# Patient Record
Sex: FEMALE | Employment: FULL TIME | URBAN - METROPOLITAN AREA
[De-identification: names, ages, dates, MRNs, and addresses within clinical notes are randomized per-mention and may not be internally consistent; named-entity substitution may affect disease eponyms.]

---

## 2022-03-17 ENCOUNTER — TELEPHONE (OUTPATIENT)
Dept: ENDOCRINOLOGY | Age: 22
End: 2022-03-17

## 2022-03-17 NOTE — TELEPHONE ENCOUNTER
Fax from [facility] w/ referral to see Dr Nadir Figueredo    Dx-hyperthyroidism  Yvrose Vail NP

## 2022-06-30 ENCOUNTER — OFFICE VISIT (OUTPATIENT)
Dept: ENDOCRINOLOGY | Age: 22
End: 2022-06-30
Payer: MEDICARE

## 2022-06-30 VITALS
OXYGEN SATURATION: 98 % | HEIGHT: 65 IN | BODY MASS INDEX: 18.99 KG/M2 | TEMPERATURE: 98 F | HEART RATE: 74 BPM | SYSTOLIC BLOOD PRESSURE: 116 MMHG | DIASTOLIC BLOOD PRESSURE: 60 MMHG | WEIGHT: 114 LBS | RESPIRATION RATE: 14 BRPM

## 2022-06-30 DIAGNOSIS — E05.90 HYPERTHYROIDISM: Primary | ICD-10-CM

## 2022-06-30 DIAGNOSIS — Z34.90 PREGNANCY, UNSPECIFIED GESTATIONAL AGE: ICD-10-CM

## 2022-06-30 PROCEDURE — 99204 OFFICE O/P NEW MOD 45 MIN: CPT | Performed by: INTERNAL MEDICINE

## 2022-06-30 NOTE — PROGRESS NOTES
Never Used   Vaping Use    Vaping Use: Former    Start date: 2018   [facility] Quit date: 2020    Passive vaping exposure: Yes   Substance and Sexual Activity    Alcohol use: Not Currently    Drug use: Never    Sexual activity: Not Currently   Other Topics Concern    None   Social History Narrative    None     Social Determinants of Health     Financial Resource Strain:     Difficulty of Paying Living Expenses: Not on file   Food Insecurity:     Worried About Running Out of Food in the Last Year: Not on file    Jaelyn of Food in the Last Year: Not on file   Transportation Needs:     Lack of Transportation (Medical): Not on file    Lack of Transportation (Non-Medical): Not on file   Physical Activity:     Days of Exercise per Week: Not on file    Minutes of Exercise per Session: Not on file   Stress:     Feeling of Stress : Not on file   Social Connections:     Frequency of Communication with Friends and Family: Not on file    Frequency of Social Gatherings with Friends and Family: Not on file    Attends Syn[employer] Services: Not on file    Active Member of [address] [place] [address] [employer] or Organizations: Not on file    Attends Club or Organization Meetings: Not on file    Marital Status: Not on file   Intimate Partner Violence:     Fear of Current or Ex-Partner: Not on file    Emotionally Abused: Not on file    Physically Abused: Not on file    Sexually Abused: Not on file   Housing Stability:     Unable to Pay for Housing in the Last Year: Not on file    Number of [place] in the Last Year: Not on file    Unstable Housing in the Last Year: Not on file     Current Outpatient Medications   Medication Sig Dispense Refill    Prenatal Vit-Fe Fumarate-FA (PRENATAL COMPLETE PO) Take 1 capsule by mouth daily       No current facility-administered medications for this visit.      No Known Allergies  Family Status   Relation Name Status    Mother  Alive    Father      Brother  Alive    MGM  Alive    MGF Alive    PGM  Alive    PGF  Alive       Review of Systems:  Constitutional: has fatigue, no fever, no recent weight gain, no recent weight loss, no changes in appetite  Eyes: no eye pain, no change in vision, no eye redness, no eye irritation, no double vision  Ears, nose, throat: no nasal congestion, no sore throat, no earache, no decrease in hearing, no hoarseness, no dry mouth, no sinus problems, no difficulty swallowing, no neck lumps, no dental problems, no mouth sores, no ringing in ears  Pulmonary: no shortness of breath, no wheezing, no dyspnea on exertion, no cough  Cardiovascular: no chest pain, no lower extremity edema, no orthopnea, no intermittent leg claudication, no palpitations  Gastrointestinal: no abdominal pain, has nausea, no vomiting, has diarrhea, no constipation, no dysphagia, no heartburn, no bloating  Genitourinary: no dysuria, no urinary incontinence, no urinary hesitancy, has urinary frequency, no feelings of urinary urgency, no nocturia  Musculoskeletal: no joint swelling, has joint stiffness, no joint pain, no muscle cramps, no muscle pain  Integument/Breast: no hair loss, no skin rashes, no skin lesions, no itching, no dry skin  Neurological: no numbness, no tingling, no weakness, no confusion, has headaches, no dizziness, no fainting, no tremors, no decrease in memory, no balance problems  Psychiatric: has anxiety, has depression, has insomnia  Hematologic/Lymphatic: no tendency for easy bleeding, no swollen lymph nodes, no tendency for easy bruising  Immunology: has seasonal allergies, no frequent infections, no frequent illnesses  Endocrine: has temperature intolerance    /60   Pulse 74   Temp 98 °F (36.7 °C)   Resp 14   Ht 5' 5\" (1.651 m)   Wt 114 lb (51.7 kg)   SpO2 98%   BMI 18.97 kg/m²    Wt Readings from Last 3 Encounters:   06/30/22 114 lb (51.7 kg)     Body mass index is 18.97 kg/m².     OBJECTIVE:  Constitutional: no acute distress, well appearing and well reevaluate. Counseled patient about treatment options, monitoring, methimazole and PTU side effects, targets of treating hyperthyroidism in pregnancy  - T3; Future  - T3, Free; Future  - T4; Future  - T4, Free; Future  - CBC with Auto Differential; Future  - Comprehensive Metabolic Panel; Future  - TSH; Future  - Anti-Thyroglobulin Antibody; Future  - Thyroid Peroxidase Antibody; Future  - Thyroid Stimulating Immunoglobulin; Future  - Thyrotropin receptor antibody; Future  - T3, Free; Future  - T4; Future  - T4, Free; Future  - T3; Future  - TSH; Future    2. Pregnancy, unspecified gestational age  Continue monitoring. Counseled patient how important it is to monitor thyroid disease in pregnancy, treatment options, monitoring and hyperthyroidism, when medication is needed and when its not needed, medication side effects. Reviewed and/or ordered clinical lab results Yes  Reviewed and/or ordered radiology tests Yes   Reviewed and/or ordered other diagnostic tests No  Discussed test results with performing physician No  Independently reviewed image, tracing, or specimen No  Made a decision to obtain old records No  Reviewed and summarized old records Yes   9/2021  TSH 0.27  Total T4 8.7  12/2021TSH 0.242  FT4 1.6  5/3/2022  TSH 0.084  FT4 1.39    Obtained history from other than patient No    Nicolasacaron Gardner was counseled regarding symptoms of hyperthyroidism diagnosis, course and complications of disease if inadequately treated, side effects of medications, diagnosis, treatment options, and prognosis, risks, benefits, complications, and alternatives of treatment, labs, imaging and other studies and treatment targets and goals, thyroid disease in pregnancy, methimazole and PTU side effects, monitoring, treatment targets, monitoring targets and goals. She understands instructions and counseling.     Total time I spent for this encounter gathering history, performing physical exam, coordinating care, counseling, and documenting in the chart - 45 minutes    Return in about 4 weeks (around 7/28/2022) for thyroid problems.     Electronically signed by Breanna Jacob MD on 6/30/2022 at 9:34 PM

## 2022-07-28 ENCOUNTER — OFFICE VISIT (OUTPATIENT)
Dept: ENDOCRINOLOGY | Age: 22
End: 2022-07-28
Payer: COMMERCIAL

## 2022-07-28 VITALS
HEIGHT: 65 IN | DIASTOLIC BLOOD PRESSURE: 64 MMHG | TEMPERATURE: 98 F | OXYGEN SATURATION: 99 % | SYSTOLIC BLOOD PRESSURE: 98 MMHG | RESPIRATION RATE: 14 BRPM | BODY MASS INDEX: 19.66 KG/M2 | HEART RATE: 78 BPM | WEIGHT: 118 LBS

## 2022-07-28 DIAGNOSIS — Z34.90 PREGNANCY, UNSPECIFIED GESTATIONAL AGE: ICD-10-CM

## 2022-07-28 DIAGNOSIS — E05.90 HYPERTHYROIDISM: Primary | ICD-10-CM

## 2022-07-28 PROCEDURE — 99214 OFFICE O/P EST MOD 30 MIN: CPT | Performed by: INTERNAL MEDICINE

## 2022-07-28 RX ORDER — PROPYLTHIOURACIL 50 MG/1
50 TABLET ORAL DAILY
Qty: 30 TABLET | Refills: 2 | Status: SHIPPED | OUTPATIENT
Start: 2022-07-28 | End: 2023-07-28

## 2022-07-28 NOTE — PROGRESS NOTES
SUBJECTIVE:  Mychal Rivas is a 24 y.o. female who is being evaluated for hyperthyroidism. 1.  Hyperthyroidism  This started in 11/2021. Patient was diagnosed with hyperthyroidism. The problem has been unchanged. Previous thyroid studies include: TSH and free thyroxine. Patient started medication in N/A. Currently patient is on: N/A. Misses  N/A doses a month. Had baby girl in 3/2020.  5/2021 BW showed abnormal labs. Monitored by OB-GYN and PCP. Had neck US. Current complaints: anxiety, insomnia, diarrhea, tremor, headaches  PTSD anxiety and depression in 12/2021. Diarrhea all her life. Has shakiness in hands since 2018. Not worse. No heat intolerance, sweating, palpitations. Weight stable. History of obstructive symptoms: difficulty swallowing No, changes in voice/hoarseness No.  History of radiation to patient's neck: No  Resent iodine exposure: No  Family history includes no thyroid abnormalities. Family history of thyroid cancer: No    2. Pregnancy  20 weeks pregnancy. Due date 12/13/2022. Mild nausea, no vomiting. Last 2 weeks no vomiting. Past Medical History:   Diagnosis Date    Hyperthyroidism      Patient Active Problem List    Diagnosis Date Noted    Hyperthyroidism 06/30/2022    Pregnancy 06/30/2022     History reviewed. No pertinent surgical history.   Family History   Problem Relation Age of Onset    Heart Disease Father     Clotting Disorder Father     Diabetes type 2  Father     No Known Problems Brother     No Known Problems Maternal Grandmother     No Known Problems Maternal Grandfather     No Known Problems Paternal Grandmother     No Known Problems Paternal Grandfather      Social History     Socioeconomic History    Marital status:      Spouse name: None    Number of children: None    Years of education: None    Highest education level: None   Tobacco Use    Smoking status: Never    Smokeless tobacco: Never   Vaping Use    Vaping Use: Former    Start date: 2018    Quit date: 2020    Passive vaping exposure: Yes   Substance and Sexual Activity    Alcohol use: Not Currently    Drug use: Never    Sexual activity: Not Currently     Current Outpatient Medications   Medication Sig Dispense Refill    propylthiouracil (PTU) 50 MG tablet Take 1 tablet by mouth in the morning. 30 tablet 2    Prenatal Vit-Fe Fumarate-FA (PRENATAL COMPLETE PO) Take 1 capsule by mouth daily       No current facility-administered medications for this visit.      No Known Allergies  Family Status   Relation Name Status    Mother  Alive    Father      Brother  Alive    MGM  Alive    MGF  Alive    [address]  Alive    PGF  Alive       Review of Systems:  Constitutional: has fatigue, no fever, no recent weight gain, no recent weight loss, no changes in appetite  Eyes: no eye pain, no change in vision, no eye redness, no eye irritation, no double vision  Ears, nose, throat: no nasal congestion, no sore throat, no earache, no decrease in hearing, no hoarseness, no dry mouth, no sinus problems, no difficulty swallowing, no neck lumps, no dental problems, no mouth sores, no ringing in ears  Pulmonary: no shortness of breath, no wheezing, no dyspnea on exertion, no cough  Cardiovascular: no chest pain, no lower extremity edema, no orthopnea, no intermittent leg claudication, no palpitations  Gastrointestinal: no abdominal pain, has nausea, no vomiting, has diarrhea, no constipation, no dysphagia, no heartburn, no bloating  Genitourinary: no dysuria, no urinary incontinence, no urinary hesitancy, has urinary frequency, no feelings of urinary urgency, no nocturia  Musculoskeletal: no joint swelling, has joint stiffness, no joint pain, no muscle cramps, no muscle pain  Integument/Breast: no hair loss, no skin rashes, no skin lesions, no itching, no dry skin  Neurological: no numbness, no tingling, no weakness, no confusion, has headaches, no dizziness, no fainting, no tremors, no decrease in memory, no balance problems  Psychiatric: has anxiety, has depression, has insomnia  Hematologic/Lymphatic: no tendency for easy bleeding, no swollen lymph nodes, no tendency for easy bruising  Immunology: has seasonal allergies, no frequent infections, no frequent illnesses  Endocrine: has temperature intolerance    BP 98/64   Pulse 78   Temp 98 °F (36.7 °C)   Resp 14   Ht 5' 5\" (1.651 m)   Wt 118 lb (53.5 kg)   SpO2 99%   BMI 19.64 kg/m²    Wt Readings from Last 3 Encounters:   07/28/22 118 lb (53.5 kg)   06/30/22 114 lb (51.7 kg)     Body mass index is 19.64 kg/m².     OBJECTIVE:  Constitutional: no acute distress, well appearing and well nourished  Psychiatric: oriented to person, place and time, judgement and insight and normal, recent and remote memory and intact and mood and affect are normal  Skin: skin and subcutaneous tissue is normal without mass, normal turgor  Head and Face: examination of head and face revealed no abnormalities  Eyes: no lid or conjunctival swelling, erythema or discharge, pupils are normal, equal, round, reactive to light, no lid lag, stare, proptosis  Ears/Nose: external inspection of ears and nose revealed no abnormalities, hearing is grossly normal  Oropharynx/Mouth/Face: lips, tongue and gums are normal with no lesions, the voice quality was normal  Neck: neck is supple and symmetric, with midline trachea and no masses, thyroid is mildly enlarged  Lymphatics: normal cervical lymph nodes, normal supraclavicular nodes  Pulmonary: no increased work of breathing or signs of respiratory distress, lungs are clear to auscultation  Cardiovascular: normal heart rate and rhythm, normal S1 and S2, no murmurs and pedal pulses and 2+ bilaterally, No edema  Abdomen: abdomen is soft, non-tender with no masses  Musculoskeletal: normal gait and station and exam of the digits and nails are normal  Neurological: normal coordination and normal general cortical function, has mild tremor      Lab Review:    No results found for: WBC, HGB, HCT, MCV, PLT  No results found for: NA, K, CL, CO2, BUN, CREATININE, GLUCOSE, CALCIUM, PROT, LABALBU, BILITOT, ALKPHOS, AST, ALT, LABGLOM, GFRAA, AGRATIO, GLOB  No results found for: TSHFT4, TSH, FT3  No results found for: LABA1C  No results found for: EAG  No results found for: CHOL  No results found for: TRIG  No results found for: HDL  No results found for: LDLCHOLESTEROL, LDLCALC  No results found for: LABVLDL, VLDL  No results found for: CHOLHDLRATIO  No results found for: LABMICR, OVMG40BJM  No results found for: IQPJ12     ASSESSMENT/PLAN:  1. Hyperthyroidism  Worsening  Start PTU 50 mg daily  TSH <0.005  FT41.35  FT3 4.0  Total T4 14.8  Total T3 262  Counseled patient about treatment options, monitoring, methimazole and PTU side effects, targets of treating hyperthyroidism in pregnancy  - T3; Future  - T3, Free; Future  - T4; Future  - T4, Free; Future  - CBC with Auto Differential; Future  - Comprehensive Metabolic Panel; Future  - TSH; Future  - Thyroid Stimulating Immunoglobulin; Future  - Thyrotropin receptor antibody; Future    2. Pregnancy, unspecified gestational age  25 weeks pregnant  Due date 12/13/2022  Continue monitoring. Counseled patient how important it is to monitor thyroid disease in pregnancy, treatment options, monitoring and hyperthyroidism, when medication is needed and when its not needed, medication side effects.       Reviewed and/or ordered clinical lab results Yes  Reviewed and/or ordered radiology tests Yes   Reviewed and/or ordered other diagnostic tests No  Discussed test results with performing physician No  Independently reviewed image, tracing, or specimen No  Made a decision to obtain old records No  Reviewed and summarized old records Yes   9/2021  TSH 0.27  Total T4 8.7  12/2021  TSH 0.242  FT4 1.6  5/3/2022  TSH 0.084  FT4 1.39    Obtained history from other than patient No    Fortunato Jacob was counseled regarding symptoms of hyperthyroidism diagnosis, course and complications of disease if inadequately treated, side effects of medications, diagnosis, treatment options, and prognosis, risks, benefits, complications, and alternatives of treatment, labs, imaging and other studies and treatment targets and goals, thyroid disease in pregnancy, methimazole and PTU side effects, monitoring, treatment targets, monitoring targets and goals. She understands instructions and counseling. Total time I spent for this encounter gathering history, performing physical exam, coordinating care, counseling, and documenting in the chart - 30 minutes    Return in about 5 weeks (around 9/2/2022) for thyroid problems.     Electronically signed by Adeel Bustos MD on 7/28/2022 at 12:30 PM

## 2022-09-02 ENCOUNTER — TELEPHONE (OUTPATIENT)
Dept: ENDOCRINOLOGY | Age: 22
End: 2022-09-02

## 2022-09-02 NOTE — TELEPHONE ENCOUNTER
Called pt at [phone] to see if she was outside, doors on Friday don't open til 7:30 at [facility]. Pt did not answer, LVM. Pt was not at door when opened, as of 0748 pt has not arrived.

## 2022-10-03 ENCOUNTER — TELEPHONE (OUTPATIENT)
Dept: ENDOCRINOLOGY | Age: 22
End: 2022-10-03

## 2022-10-03 DIAGNOSIS — E05.90 HYPERTHYROIDISM: Primary | ICD-10-CM

## 2022-10-03 NOTE — TELEPHONE ENCOUNTER
I left patient a message to obtain labs, I will review them and then decide for further plan. Printed lab orders. Please confirm that patient reviewed the message. Let me know.

## 2022-10-03 NOTE — TELEPHONE ENCOUNTER
JUANJOSE,    10/4/2022    9:10 AM  20 mins. Dr. Renuka Roche MD     [facility]     Reason for Cancellation: Illness/Hospitalization     Patient Comments: Was to be seen through pregnancy and then take a break to allow everything to level out post-delivery for a reevaluation. Went into pre term labor and delivered my baby early on 9/27/22 at 33 weeks.  Please let me know when to be seen again to reevaluate    Please advise